# Patient Record
Sex: FEMALE | URBAN - NONMETROPOLITAN AREA
[De-identification: names, ages, dates, MRNs, and addresses within clinical notes are randomized per-mention and may not be internally consistent; named-entity substitution may affect disease eponyms.]

---

## 2020-09-01 ENCOUNTER — OFFICE VISIT (OUTPATIENT)
Dept: URBAN - NONMETROPOLITAN AREA CLINIC 2 | Facility: CLINIC | Age: 75
End: 2020-09-01

## 2020-09-15 ENCOUNTER — OFFICE VISIT (OUTPATIENT)
Dept: URBAN - NONMETROPOLITAN AREA CLINIC 2 | Facility: CLINIC | Age: 75
End: 2020-09-15

## 2020-09-15 ENCOUNTER — LAB OUTSIDE AN ENCOUNTER (OUTPATIENT)
Dept: URBAN - NONMETROPOLITAN AREA CLINIC 2 | Facility: CLINIC | Age: 75
End: 2020-09-15

## 2020-09-15 DIAGNOSIS — Z80.0 FAMILY HISTORY OF COLON CANCER: ICD-10-CM

## 2020-09-15 DIAGNOSIS — Z86.010 PERSONAL HISTORY OF COLONIC POLYPS: ICD-10-CM

## 2020-09-15 DIAGNOSIS — K21.9 GERD WITHOUT ESOPHAGITIS: ICD-10-CM

## 2020-09-15 DIAGNOSIS — Z12.11 SCREENING FOR COLON CANCER: ICD-10-CM

## 2020-09-15 PROBLEM — 428283002: Status: ACTIVE | Noted: 2020-09-15

## 2020-09-15 PROCEDURE — 1036F TOBACCO NON-USER: CPT | Performed by: NURSE PRACTITIONER

## 2020-09-15 PROCEDURE — G8427 DOCREV CUR MEDS BY ELIG CLIN: HCPCS | Performed by: NURSE PRACTITIONER

## 2020-09-15 PROCEDURE — 99203 OFFICE O/P NEW LOW 30 MIN: CPT | Performed by: NURSE PRACTITIONER

## 2020-09-15 PROCEDURE — G8417 CALC BMI ABV UP PARAM F/U: HCPCS | Performed by: NURSE PRACTITIONER

## 2020-09-15 RX ORDER — IBUPROFEN SODIUM 256 MG/1
1 TABLET WITH FOOD OR MILK AS NEEDED TABLET, COATED ORAL THREE TIMES A DAY
Status: ACTIVE | COMMUNITY

## 2020-09-15 RX ORDER — GABAPENTIN 300 MG/1
1 CAPSULE CAPSULE ORAL ONCE A DAY
Status: ACTIVE | COMMUNITY

## 2020-09-15 RX ORDER — ATORVASTATIN CALCIUM 40 MG/1
1 TABLET TABLET, FILM COATED ORAL ONCE A DAY
Status: ACTIVE | COMMUNITY

## 2020-09-15 RX ORDER — LANSOPRAZOLE 30 MG/1
1 CAPSULE BEFORE A MEAL CAPSULE, DELAYED RELEASE ORAL ONCE A DAY
Status: ACTIVE | COMMUNITY

## 2020-09-15 RX ORDER — IPRATROPIUM BROMIDE AND ALBUTEROL SULFATE .5; 2.5 MG/3ML; MG/3ML
3 ML AS NEEDED SOLUTION RESPIRATORY (INHALATION)
Status: ACTIVE | COMMUNITY

## 2020-09-15 RX ORDER — CLONIDINE HYDROCHLORIDE 0.1 MG/1
1 TABLET TABLET ORAL ONCE A DAY
Status: ACTIVE | COMMUNITY

## 2020-09-15 RX ORDER — BUSPIRONE HYDROCHLORIDE 10 MG/1
1 TABLET TABLET ORAL TWICE A DAY
Status: ACTIVE | COMMUNITY

## 2020-09-15 RX ORDER — LORATADINE 10 MG
1 PACKET MIXED WITH 8 OUNCES OF FLUID TABLET,DISINTEGRATING ORAL ONCE A DAY
Status: ACTIVE | COMMUNITY

## 2020-09-15 RX ORDER — OXYBUTYNIN CHLORIDE 15 MG/1
1 TABLET TABLET, EXTENDED RELEASE ORAL ONCE A DAY
Status: ACTIVE | COMMUNITY

## 2020-09-15 RX ORDER — ALBUTEROL SULFATE 90 UG/1
1 PUFF AS NEEDED AEROSOL, METERED RESPIRATORY (INHALATION)
Status: ACTIVE | COMMUNITY

## 2020-09-15 RX ORDER — CITALOPRAM HYDROBROMIDE 20 MG/1
1 TABLET TABLET, FILM COATED ORAL ONCE A DAY
Status: ACTIVE | COMMUNITY

## 2020-09-15 RX ORDER — OXYCODONE AND ACETAMINOPHEN 5; 325 MG/1; MG/1
1 TABLET AS NEEDED TABLET ORAL
Status: ACTIVE | COMMUNITY

## 2020-09-15 RX ORDER — NYSTATIN 10B UNIT
AS DIRECTED POWDER (EA) MISCELLANEOUS
Status: ACTIVE | COMMUNITY

## 2020-09-15 RX ORDER — UMECLIDINIUM 62.5 UG/1
1 PUFF AEROSOL, POWDER ORAL ONCE A DAY
Status: ACTIVE | COMMUNITY

## 2020-09-15 RX ORDER — GUAIFENESIN 400 MG/1
1 TABLET AS NEEDED TABLET ORAL
Status: ACTIVE | COMMUNITY

## 2020-09-15 RX ORDER — THYROID, PORCINE 60 MG/1
1 TABLET ON AN EMPTY STOMACH TABLET ORAL ONCE A DAY
Status: ACTIVE | COMMUNITY

## 2020-09-15 RX ORDER — LOSARTAN POTASSIUM 50 MG/1
1 TABLET TABLET ORAL ONCE A DAY
Status: ACTIVE | COMMUNITY

## 2020-09-15 RX ORDER — LITHIUM CARBONATE 150 MG/1
1 CAPSULE CAPSULE, GELATIN COATED ORAL TWICE A DAY
Status: ACTIVE | COMMUNITY

## 2020-09-15 RX ORDER — DOCUSATE SODIUM 100 MG/1
1 CAPSULE AS NEEDED CAPSULE ORAL ONCE A DAY
Status: ACTIVE | COMMUNITY

## 2020-09-15 RX ORDER — MULTIVIT-MIN/IRON/FOLIC ACID/K 18-600-40
AS DIRECTED CAPSULE ORAL
Status: ACTIVE | COMMUNITY

## 2020-09-15 RX ORDER — CLONAZEPAM 0.5 MG/1
1 TABLET AT BEDTIME TABLET ORAL ONCE A DAY
Status: ACTIVE | COMMUNITY

## 2020-09-15 RX ORDER — HYDRALAZINE HYDROCHLORIDE 25 MG/1
1 TABLET WITH FOOD TABLET, FILM COATED ORAL THREE TIMES A DAY
Status: ACTIVE | COMMUNITY

## 2020-09-15 NOTE — HPI-TODAY'S VISIT:
Mrs. Adan is a 75-year-old female with personal history of colon polyps and family history of colon cancer in her father and brother who presents for colonoscopy.  She last had colonoscopy about 5 years ago by Dr. Danielito Campuzano in [facility].  She had a couple of precancerous polyps at that time and was told to repeat her colonoscopy in 3 years.  She has normal bowels on MiraLAX daily.  No rectal bleeding or black stools.  No anemia. She has had reflux for more than 10 to 15 years.  This is well controlled on Prevacid.  She has never had EGD.  She rarely has breakthrough symptoms.  No nausea vomiting.  No extra esophageal manifestations. She had a fall earlier this year and broke her left ankle.  She has been a resident at the [facility] since.  She has limited mobility related to this.  Otherwise, she is doing well.  TG

## 2020-10-05 ENCOUNTER — LAB OUTSIDE AN ENCOUNTER (OUTPATIENT)
Dept: URBAN - METROPOLITAN AREA CLINIC 92 | Facility: CLINIC | Age: 75
End: 2020-10-05

## 2020-10-05 ENCOUNTER — TELEPHONE ENCOUNTER (OUTPATIENT)
Dept: URBAN - METROPOLITAN AREA CLINIC 92 | Facility: CLINIC | Age: 75
End: 2020-10-05

## 2020-10-05 ENCOUNTER — OFFICE VISIT (OUTPATIENT)
Dept: URBAN - METROPOLITAN AREA MEDICAL CENTER 1 | Facility: MEDICAL CENTER | Age: 75
End: 2020-10-05

## 2020-10-05 DIAGNOSIS — K31.89 ACQUIRED DEFORMITY OF DUODENUM: ICD-10-CM

## 2020-10-05 DIAGNOSIS — K22.8 COLUMNAR-LINED ESOPHAGUS: ICD-10-CM

## 2020-10-05 DIAGNOSIS — B37.81 CANDIDA ESOPHAGITIS: ICD-10-CM

## 2020-10-05 DIAGNOSIS — D12.2 ADENOMA OF ASCENDING COLON: ICD-10-CM

## 2020-10-05 DIAGNOSIS — D12.4 ADENOMATOUS POLYP OF DESCENDING COLON: ICD-10-CM

## 2020-10-05 DIAGNOSIS — Z86.010 H/O ADENOMATOUS POLYP OF COLON: ICD-10-CM

## 2020-10-05 PROCEDURE — 43239 EGD BIOPSY SINGLE/MULTIPLE: CPT | Performed by: INTERNAL MEDICINE

## 2020-10-05 PROCEDURE — G9936 PMH PLYP/NEO CO/RECT/JUN/ANS: HCPCS | Performed by: INTERNAL MEDICINE

## 2020-10-05 PROCEDURE — 45385 COLONOSCOPY W/LESION REMOVAL: CPT | Performed by: INTERNAL MEDICINE

## 2020-10-05 RX ORDER — LOSARTAN POTASSIUM 50 MG/1
1 TABLET TABLET ORAL ONCE A DAY
Status: ACTIVE | COMMUNITY

## 2020-10-05 RX ORDER — ATORVASTATIN CALCIUM 40 MG/1
1 TABLET TABLET, FILM COATED ORAL ONCE A DAY
Status: ACTIVE | COMMUNITY

## 2020-10-05 RX ORDER — NYSTATIN 10B UNIT
AS DIRECTED POWDER (EA) MISCELLANEOUS
Status: ACTIVE | COMMUNITY

## 2020-10-05 RX ORDER — HYDRALAZINE HYDROCHLORIDE 25 MG/1
1 TABLET WITH FOOD TABLET, FILM COATED ORAL THREE TIMES A DAY
Status: ACTIVE | COMMUNITY

## 2020-10-05 RX ORDER — DOCUSATE SODIUM 100 MG/1
1 CAPSULE AS NEEDED CAPSULE ORAL ONCE A DAY
Status: ACTIVE | COMMUNITY

## 2020-10-05 RX ORDER — LANSOPRAZOLE 30 MG/1
1 CAPSULE BEFORE A MEAL CAPSULE, DELAYED RELEASE ORAL ONCE A DAY
Status: ACTIVE | COMMUNITY

## 2020-10-05 RX ORDER — LORATADINE 10 MG
1 PACKET MIXED WITH 8 OUNCES OF FLUID TABLET,DISINTEGRATING ORAL ONCE A DAY
Status: ACTIVE | COMMUNITY

## 2020-10-05 RX ORDER — OXYCODONE AND ACETAMINOPHEN 5; 325 MG/1; MG/1
1 TABLET AS NEEDED TABLET ORAL
Status: ACTIVE | COMMUNITY

## 2020-10-05 RX ORDER — THYROID, PORCINE 60 MG/1
1 TABLET ON AN EMPTY STOMACH TABLET ORAL ONCE A DAY
Status: ACTIVE | COMMUNITY

## 2020-10-05 RX ORDER — UMECLIDINIUM 62.5 UG/1
1 PUFF AEROSOL, POWDER ORAL ONCE A DAY
Status: ACTIVE | COMMUNITY

## 2020-10-05 RX ORDER — CLONAZEPAM 0.5 MG/1
1 TABLET AT BEDTIME TABLET ORAL ONCE A DAY
Status: ACTIVE | COMMUNITY

## 2020-10-05 RX ORDER — OXYBUTYNIN CHLORIDE 15 MG/1
1 TABLET TABLET, EXTENDED RELEASE ORAL ONCE A DAY
Status: ACTIVE | COMMUNITY

## 2020-10-05 RX ORDER — CITALOPRAM HYDROBROMIDE 20 MG/1
1 TABLET TABLET, FILM COATED ORAL ONCE A DAY
Status: ACTIVE | COMMUNITY

## 2020-10-05 RX ORDER — LITHIUM CARBONATE 150 MG/1
1 CAPSULE CAPSULE, GELATIN COATED ORAL TWICE A DAY
Status: ACTIVE | COMMUNITY

## 2020-10-05 RX ORDER — MULTIVIT-MIN/IRON/FOLIC ACID/K 18-600-40
AS DIRECTED CAPSULE ORAL
Status: ACTIVE | COMMUNITY

## 2020-10-05 RX ORDER — IPRATROPIUM BROMIDE AND ALBUTEROL SULFATE .5; 2.5 MG/3ML; MG/3ML
3 ML AS NEEDED SOLUTION RESPIRATORY (INHALATION)
Status: ACTIVE | COMMUNITY

## 2020-10-05 RX ORDER — BUSPIRONE HYDROCHLORIDE 10 MG/1
1 TABLET TABLET ORAL TWICE A DAY
Status: ACTIVE | COMMUNITY

## 2020-10-05 RX ORDER — IBUPROFEN SODIUM 256 MG/1
1 TABLET WITH FOOD OR MILK AS NEEDED TABLET, COATED ORAL THREE TIMES A DAY
Status: ACTIVE | COMMUNITY

## 2020-10-05 RX ORDER — GUAIFENESIN 400 MG/1
1 TABLET AS NEEDED TABLET ORAL
Status: ACTIVE | COMMUNITY

## 2020-10-05 RX ORDER — ALBUTEROL SULFATE 90 UG/1
1 PUFF AS NEEDED AEROSOL, METERED RESPIRATORY (INHALATION)
Status: ACTIVE | COMMUNITY

## 2020-10-05 RX ORDER — GABAPENTIN 300 MG/1
1 CAPSULE CAPSULE ORAL ONCE A DAY
Status: ACTIVE | COMMUNITY

## 2020-10-05 RX ORDER — CLONIDINE HYDROCHLORIDE 0.1 MG/1
1 TABLET TABLET ORAL ONCE A DAY
Status: ACTIVE | COMMUNITY

## 2020-11-02 ENCOUNTER — OFFICE VISIT (OUTPATIENT)
Dept: URBAN - NONMETROPOLITAN AREA CLINIC 2 | Facility: CLINIC | Age: 75
End: 2020-11-02

## 2021-05-04 ENCOUNTER — TELEPHONE ENCOUNTER (OUTPATIENT)
Dept: URBAN - NONMETROPOLITAN AREA CLINIC 1 | Facility: CLINIC | Age: 76
End: 2021-05-04